# Patient Record
Sex: MALE | Race: WHITE | HISPANIC OR LATINO | Employment: OTHER | URBAN - METROPOLITAN AREA
[De-identification: names, ages, dates, MRNs, and addresses within clinical notes are randomized per-mention and may not be internally consistent; named-entity substitution may affect disease eponyms.]

---

## 2017-01-19 ENCOUNTER — HOSPITAL ENCOUNTER (OUTPATIENT)
Dept: SLEEP CENTER | Facility: CLINIC | Age: 57
Discharge: HOME | End: 2017-01-19
Payer: MEDICARE

## 2017-01-19 ENCOUNTER — TRANSCRIBE ORDERS (OUTPATIENT)
Dept: SLEEP CENTER | Facility: CLINIC | Age: 57
End: 2017-01-19

## 2017-01-19 DIAGNOSIS — G47.33 OBSTRUCTIVE SLEEP APNEA (ADULT) (PEDIATRIC): ICD-10-CM

## 2017-01-19 DIAGNOSIS — G47.33 OBSTRUCTIVE SLEEP APNEA (ADULT) (PEDIATRIC): Primary | ICD-10-CM

## 2018-01-18 ENCOUNTER — HOSPITAL ENCOUNTER (OUTPATIENT)
Dept: SLEEP CENTER | Facility: CLINIC | Age: 58
Discharge: HOME | End: 2018-01-18

## 2018-01-18 ENCOUNTER — TRANSCRIBE ORDERS (OUTPATIENT)
Dept: SLEEP CENTER | Facility: CLINIC | Age: 58
End: 2018-01-18

## 2018-01-18 DIAGNOSIS — G47.33 OBSTRUCTIVE SLEEP APNEA: ICD-10-CM

## 2018-01-18 DIAGNOSIS — G47.33 OBSTRUCTIVE SLEEP APNEA SYNDROME: Primary | ICD-10-CM

## 2018-01-18 NOTE — PROGRESS NOTES
Progress Note - Sleep Center   Madhuri Senior 65 y o  male MRN: [id number]        Follow Up Evaluation / Problem:     1  Obstructive sleep apnea  2  Obesity      HPI: Madhuri Senior is a 65y o  year old male  He has been using nasal CPAP for many years  His last CPAP titration was on 01/19/2007  He has been very compliant with treatment and feels that he is getting sustained benefit with greater wakefulness and no need for daytime naps  ROS: See HPI, all other systems are negative  Historical Information     Past History Since Last Sleep Center Visit:     November this year he underwent replacement of his right ankle  Today he is wearing a boot on that foot and using single-point cane  He states that he will be at full weight-bearing status within the next several weeks  He denies any difficulty with the use of nasal CPAP  Unfortunately, he has been unable to lose any weight since last year  His surgery and inactivity have been definite factors contributing to this problem  ALLERGIES  Recorded on medication sheet in chart     MEDICATION  Recorded on medication sheet in chart    OBJECTIVE    Vital signs are recorded in patient's chart    PAP Pressure:  Nasal CPAP set to deliver 10 centimeters of water pressure  DME Provider: [employer]    Physical Exam: No significant changes other than reported in HPI  ASSESSMENT / PLAN    Assessment:   1  Obstructive sleep apnea  2  Obesity  3  Recent right ankle replacement    Plan:  1  Continue nasal CPAP as above  2  Weight loss using a combination of diet and exercise as possible  3  Consider referral to a weight loss center    Counseling / Coordination of Care  Total clinic time spent today 20 minutes  Greater than 50% of total time was spent with The patient and / or family counseling and / or coordination of care  A description of the counseling and / or coordination of care:     Today we discussed his current use of nasal CPAP   He has been very compliant since the start of treatment  He finds that he continues to obtain good results with relief from daytime sleepiness  He no longer naps  He is much more awake alert and energetic during the day  His weight is not significantly improved, however, his recent right ankle surgery has made it impossible for him to be physically active  This is likely a contributing factor  He will likely start a rehabilitation program in the near future  This along with a better controlled diet should help him to begin to lose weight on his own  If he is unable to achieve weight loss by himself I will consider referring him to a weight loss center  He will continue using nasal CPAP at the parameters noted above  I will provide him with a prescription so that he may receive new supplies over the next 12 months  He will return in 1 year for routine re-evaluation  Ana Lilia Covington DO    Board Certified in [place] [employer]

## 2019-01-24 ENCOUNTER — OFFICE VISIT (OUTPATIENT)
Dept: SLEEP CENTER | Facility: CLINIC | Age: 59
End: 2019-01-24
Payer: MEDICARE

## 2019-01-24 VITALS
SYSTOLIC BLOOD PRESSURE: 138 MMHG | HEART RATE: 74 BPM | HEIGHT: 69 IN | OXYGEN SATURATION: 94 % | WEIGHT: 268 LBS | DIASTOLIC BLOOD PRESSURE: 76 MMHG | BODY MASS INDEX: 39.69 KG/M2

## 2019-01-24 DIAGNOSIS — G47.33 OBSTRUCTIVE SLEEP APNEA TREATED WITH CONTINUOUS POSITIVE AIRWAY PRESSURE (CPAP): Primary | ICD-10-CM

## 2019-01-24 DIAGNOSIS — Z99.89 OBSTRUCTIVE SLEEP APNEA TREATED WITH CONTINUOUS POSITIVE AIRWAY PRESSURE (CPAP): Primary | ICD-10-CM

## 2019-01-24 DIAGNOSIS — E66.01 CLASS 2 SEVERE OBESITY DUE TO EXCESS CALORIES WITH SERIOUS COMORBIDITY AND BODY MASS INDEX (BMI) OF 39.0 TO 39.9 IN ADULT (HCC): ICD-10-CM

## 2019-01-24 PROBLEM — E66.812 CLASS 2 SEVERE OBESITY DUE TO EXCESS CALORIES WITH SERIOUS COMORBIDITY AND BODY MASS INDEX (BMI) OF 39.0 TO 39.9 IN ADULT (HCC): Status: ACTIVE | Noted: 2019-01-24

## 2019-01-24 PROCEDURE — 99214 OFFICE O/P EST MOD 30 MIN: CPT | Performed by: NURSE PRACTITIONER

## 2019-01-24 RX ORDER — DIAZEPAM 10 MG/1
TABLET ORAL AS NEEDED
COMMUNITY

## 2019-01-24 RX ORDER — PREGABALIN 150 MG/1
CAPSULE ORAL EVERY 12 HOURS
COMMUNITY

## 2019-01-24 RX ORDER — MULTIVITAMIN WITH IRON
100 TABLET ORAL DAILY
COMMUNITY

## 2019-01-24 RX ORDER — PERPHENAZINE 16 MG
TABLET ORAL
COMMUNITY

## 2019-01-24 RX ORDER — DICLOFENAC SODIUM 75 MG/1
75 TABLET, DELAYED RELEASE ORAL
COMMUNITY

## 2019-01-24 RX ORDER — OXYCODONE HYDROCHLORIDE AND ACETAMINOPHEN 5; 325 MG/1; MG/1
1 TABLET ORAL AS NEEDED
COMMUNITY

## 2019-01-24 RX ORDER — LISINOPRIL 20 MG/1
TABLET ORAL
COMMUNITY
End: 2020-01-24

## 2019-01-24 RX ORDER — ATORVASTATIN CALCIUM 20 MG/1
20 TABLET, FILM COATED ORAL
COMMUNITY
Start: 2018-08-09

## 2019-01-24 NOTE — PATIENT INSTRUCTIONS
1   Continue use of CPAP equipment nightly  2  Continue to clean your equipment, as discussed  2 5  Review your dream  dell with goal of AHI less than 5   3   Contact the [facility] Disorders Center with any questions or concerns prior to your next visit, as needed  4    Schedule visit for follow-up in 1 year

## 2019-01-24 NOTE — PROGRESS NOTES
Progress Note - [address] 63 y o  male   :1960, MRN: [id number]  2019          Follow Up Evaluation / Problem:     Obstructive Sleep Apnea    HPI: Oly Salgado is a 63y o  year old male  He has been using CPAP to treat his obstructive sleep apnea for many years and is here for an annual follow up visit  He reports that he uses his equipment every night and feels comfortable when using it  He denies frequent night time awakenings or daytime sleepiness  He states that if he does not use it, he develops edema of the uvula, sore throat and extreme daytime sleepiness within 1-2 days of not using it      Review of Systems      Genitourinary none   Cardiology ankle/leg swelling - related to back problems   Gastrointestinal none   Neurology numbness/tingling of an extremity - R/T back problems   Constitutional none   Integumentary none   Psychiatry none   Musculoskeletal joint pain, back pain, sciatica and leg cramps   Pulmonary none   ENT none   Endocrine none   Hematological none       Current Outpatient Prescriptions:     Alpha-Lipoic Acid 600 MG CAPS, Take by mouth, Disp: , Rfl:     atorvastatin (LIPITOR) 20 mg tablet, Take 20 mg by mouth, Disp: , Rfl:     diazepam (VALIUM) 10 mg tablet, every 6 (six) hours, Disp: , Rfl:     diclofenac (VOLTAREN) 75 mg EC tablet, Take 75 mg by mouth, Disp: , Rfl:     lisinopril (ZESTRIL) 20 mg tablet, lisinopril 20 mg tablet, Disp: , Rfl:     oxyCODONE-acetaminophen (PERCOCET) 5-325 mg per tablet, Take 1 tablet by mouth every 6 (six) hours as needed, Disp: , Rfl:     pregabalin (LYRICA) 150 mg capsule, Every 12 hours, Disp: , Rfl:     pyridoxine (VITAMIN B6) 100 mg tablet, Take 100 mg by mouth daily, Disp: , Rfl:     Thiamine HCl (VITAMIN B-1 PO), Take 200 mg by mouth, Disp: , Rfl:     [place] Sleepiness Scale  Sitting and reading: Would never doze  Watching TV: Slight chance of dozing  Sitting, inactive in a public place (e g  a theatre or a meeting): Would never doze  As a passenger in a car for an hour without a break: Would never doze  Lying down to rest in the afternoon when circumstances permit: Slight chance of dozing  Sitting and talking to someone: Would never doze  Sitting quietly after a lunch without alcohol: Would never doze  In a car, while stopped for a few minutes in traffic: Would never doze  Total score: 2              Vitals:    01/24/19 0900   BP: 138/76   Pulse: 74   SpO2: 94%   Weight: 122 kg (268 lb)   Height: 5' 9" (1 753 m)       Body mass index is 39 58 kg/m²  Neck Circumference: 43 (cm)       EPWORTH SLEEPINESS SCORE  Total score: 2      Past History Since Last Sleep Center Visit:     He denies any significant changes to his health since his last visit  He continues with chronic back pain  He reports that he uses his CPAP equipment every night  He cleans it with soap and water and changes his supplies regularly  The review of systems and following portions of the patient's history were reviewed and updated as appropriate: allergies, current medications, past family history, past medical history, past social history, past surgical history, and problem list         OBJECTIVE    PAP Pressure: Nasal CPAP set to deliver 10 cm of water pressure  DME Provider: [employer]    Physical Exam:     General Appearance:   Alert, cooperative, no distress, appears stated age, obese     Head:   Normocephalic, without obvious abnormality, atraumatic     Eyes:   PERRL, conjunctiva/corneas clear, EOM's intact          Nose:  Nares normal, septum midline, no drainage or sinus tenderness           Throat:  Lips, teeth and gums normal; tongue normal size and  shape and midline mucosa moist and redundant bilaterally, uvula only partially visualized, tonsils not visualized, Mallampati class 3-4       Neck:  Supple, symmetrical, trachea midline, no adenopathy;  Thyroid: No enlargement, tenderness or nodules; no carotid bruit or JVD     Lungs:      Clear to auscultation bilaterally, respirations unlabored     Heart:   Regular rate and rhythm, S1 and S2 normal, no murmur, rub or gallop       Extremities:  Extremities normal, atraumatic, no cyanosis or edema     Pulses:  2+ and symmetric all extremities     Skin:  Skin color, texture, turgor normal, no rashes or lesions       Neurologic:  No focal deficits noted  Normal strength, sensation throughout       ASSESSMENT / PLAN    1  Obstructive sleep apnea treated with continuous positive airway pressure (CPAP)  PAP DME Resupply/Reorder   2  Class 2 severe obesity due to excess calories with serious comorbidity and body mass index (BMI) of 39 0 to 39 9 in adult [facility])             Counseling / Coordination of Care  Total clinic time spent today 30 minutes  Greater than 50% of total time was spent with the patient and / or family counseling and / or coordination of care  A description of the counseling / coordination of care:     Impressions, Prognosis, Instructions for management, Risks and benefits of treatment, Patient and family education, Risk factor reductions and Importance of compliance with treatment    Unfortunately, his CPAP equipment is not transmitting data via the modem  He uses the [employer]  and we were able to view that today during his visit  The most recent data available was from April 2018  Upon review, his AHI was consistently less than 2  He will reconnect his blue tooth and upload data to review  We discussed that the AHI should ideally be less than 5  He will call if he has consistently higher numbers  Subjectively, he is doing very well with his CPAP ad he feels he derives significant benefit  Supplies have been ordered for the next year  He will continue to use his equipment nightly over the next year  He will schedule a follow up visit in one year    The following instructions have been given to the patient today:    Patient Instructions   1  Continue use of CPAP equipment nightly  2  Continue to clean your equipment, as discussed  2 5  Review your dream  dell with goal of AHI less than 5   3   Contact the Sleep Disorders Center with any questions or concerns prior to your next visit, as needed  4    Schedule visit for follow-up in 1 year        Naty Mosquera, [facility]

## 2020-01-24 ENCOUNTER — OFFICE VISIT (OUTPATIENT)
Dept: SLEEP CENTER | Facility: CLINIC | Age: 60
End: 2020-01-24
Payer: COMMERCIAL

## 2020-01-24 VITALS — HEIGHT: 69 IN | BODY MASS INDEX: 39.58 KG/M2

## 2020-01-24 DIAGNOSIS — G47.33 OBSTRUCTIVE SLEEP APNEA TREATED WITH CONTINUOUS POSITIVE AIRWAY PRESSURE (CPAP): Primary | ICD-10-CM

## 2020-01-24 DIAGNOSIS — E66.01 CLASS 2 SEVERE OBESITY DUE TO EXCESS CALORIES WITH SERIOUS COMORBIDITY AND BODY MASS INDEX (BMI) OF 39.0 TO 39.9 IN ADULT (HCC): ICD-10-CM

## 2020-01-24 DIAGNOSIS — Z99.89 OBSTRUCTIVE SLEEP APNEA TREATED WITH CONTINUOUS POSITIVE AIRWAY PRESSURE (CPAP): Primary | ICD-10-CM

## 2020-01-24 PROCEDURE — 99214 OFFICE O/P EST MOD 30 MIN: CPT | Performed by: NURSE PRACTITIONER

## 2020-01-24 RX ORDER — LOSARTAN POTASSIUM 100 MG/1
TABLET ORAL EVERY 24 HOURS
COMMUNITY

## 2020-01-24 RX ORDER — OMEPRAZOLE 20 MG/1
CAPSULE, DELAYED RELEASE ORAL
COMMUNITY
Start: 2020-01-22

## 2020-01-24 NOTE — PATIENT INSTRUCTIONS
1   Continue use of CPAP equipment nightly  2  Continue to clean your equipment, as discussed  3  Contact the Sleep [address] [place] [address] with any questions or concerns prior to your next visit, as needed  4  Schedule visit for follow-up in 1 year    Continue to use Dream  dell    Nursing Support:  When: Monday through Friday 7A-5PM except holidays  Where: Our direct line is [phone]  If you are having a true emergency please call 911  In the event that the line is busy or it is after hours please leave a voice message and we will return your call  Please speak clearly, leaving your full name, birth date, best number to reach you and the reason for your call  Medication refills: We will need the name of the medication, the dosage, the ordering provider, whether you get a 30 or 90 day refill, and the pharmacy name and address  Medications will be ordered by the provider only  Nurses cannot call in prescriptions  Please allow 7 days for medication refills  Physician requested updates: If your provider requested that you call with an update after starting medication, please be ready to provide us the medication and dosage, what time you take your medication, the time you attempt to fall asleep, time you fall asleep, when you wake up, and what time you get out of bed  Sleep Study Results: We will contact you with sleep study results and/or next steps after the physician has reviewed your testing

## 2020-01-24 NOTE — PROGRESS NOTES
Progress Note - [address] 63 y o  male   :1960, MRN: [id number]  2020          Follow Up Evaluation / Problem:     Obstructive Sleep Apnea  Obesity      Thank you for the opportunity of participating in the evaluation and care of this patient in the Sleep Clinic at [facility]  HPI: Ehsan Narvaez is a 63y o  year old male  The patient presents for follow up of obstructive sleep apnea  He has used CPAP for many years and reports that he can't sleep without using the equipment  His equipment does not transmit a compliance report, however, he use the [employer] dell on his phone and was able to download a report today for review        Review of Systems      Genitourinary none   Cardiology none   Gastrointestinal none   Neurology numbness/tingling of an extremity   Constitutional none   Integumentary none   Psychiatry none   Musculoskeletal joint pain, muscle aches, back pain, sciatica and leg cramps   Pulmonary none   ENT throat clearing   Endocrine none   Hematological none       Current Outpatient Medications:     Alpha-Lipoic Acid 600 MG CAPS, Take by mouth, Disp: , Rfl:     atorvastatin (LIPITOR) 20 mg tablet, Take 20 mg by mouth, Disp: , Rfl:     diazepam (VALIUM) 10 mg tablet, every 6 (six) hours, Disp: , Rfl:     diclofenac (VOLTAREN) 75 mg EC tablet, Take 75 mg by mouth, Disp: , Rfl:     losartan (COZAAR) 100 MG tablet, every 24 hours, Disp: , Rfl:     omeprazole (PriLOSEC) 20 mg delayed release capsule, , Disp: , Rfl:     oxyCODONE-acetaminophen (PERCOCET) 5-325 mg per tablet, Take 1 tablet by mouth every 6 (six) hours as needed, Disp: , Rfl:     pregabalin (LYRICA) 150 mg capsule, Every 12 hours, Disp: , Rfl:     pyridoxine (VITAMIN B6) 100 mg tablet, Take 100 mg by mouth daily, Disp: , Rfl:     Thiamine HCl (VITAMIN B-1 PO), Take 200 mg by mouth, Disp: , Rfl:     [place] Sleepiness Scale  Sitting and reading: Would never doze  Watching TV: Slight chance of dozing  Sitting, inactive in a public place (e g  a theatre or a meeting): Would never doze  As a passenger in a car for an hour without a break: Would never doze  Lying down to rest in the afternoon when circumstances permit: Would never doze  Sitting and talking to someone: Would never doze  Sitting quietly after a lunch without alcohol: Would never doze  In a car, while stopped for a few minutes in traffic: Would never doze  Total score: 1              Vitals:    01/24/20 0800   Height: 5' 9" (1 753 m)       Body mass index is 39 58 kg/m²  Neck Circumference: 17       EPWORTH SLEEPINESS SCORE  Total score: 1      Past History Since Last Sleep Center Visit:   He denies any changes to his health since his last visit  He reports that he uses his CPAP every night, using a nasal pillow mask  Equipment is set at 10cm of water pressure, which is comfortable for him  The patient reports that he cleans the equipment appropriately and changes supplies on a regular basis  The review of systems and following portions of the patient's history were reviewed and updated as appropriate: allergies, current medications, past family history, past medical history, past social history, past surgical history, and problem list         OBJECTIVE    PAP Pressure: Nasal CPAP set to deliver 10 cm of water pressure  DME Provider:  [employer]    Physical Exam:     General Appearance:   Alert, cooperative, no distress, appears stated age, obese     Head:   Normocephalic, without obvious abnormality, atraumatic     Eyes:   PERRL, conjunctiva/corneas clear, EOM's intact          Nose:  Nares normal, septum midline, no drainage or sinus tenderness           Throat:  Lips, teeth and gums normal; tongue normal size and  shape and midline mucosa moist and redundant bilaterally, uvula thick at the base and dipping below the base of the tongue, tonsils not visualized, Mallampati class 3-4       Neck:  Supple, symmetrical, trachea midline, no adenopathy; Thyroid: No enlargement, tenderness or nodules; no carotid bruit or JVD     Lungs:      Clear to auscultation bilaterally, respirations unlabored     Heart:   Regular rate and rhythm, S1 and S2 normal, no murmur, rub or gallop       Extremities:  Extremities normal, atraumatic, no cyanosis or edema       Skin:  Skin color, texture, turgor normal, no rashes or lesions       Neurologic: No focal deficits noted       ASSESSMENT / PLAN    1  Obstructive sleep apnea treated with continuous positive airway pressure (CPAP)  PAP DME Resupply/Reorder   2  Class 2 severe obesity due to excess calories with serious comorbidity and body mass index (BMI) of 39 0 to 39 9 in [facility])             Counseling / Coordination of Care  Total clinic time spent today 25 minutes  Greater than 50% of total time was spent with the patient and / or family counseling and / or coordination of care  A description of the counseling / coordination of care:     Impressions, Diagnostic results, Prognosis, Instructions for management, Risks and benefits of treatment, Patient and family education, Risk factor reductions and Importance of compliance with treatment    Today I reviewed the patient's compliance data  he has been able to use the equipment 100% of all days recorded  Average usage was 4 or more hours 100% of all days recorded  The estimated AHI is less than 2 5 abnormal breathing events per hour  The patient feels they benefit from the use of PAP equipment and would like to continue PAP therapy  Response to treatment has been good  He received his equipment in August of 2016  He will be eligible for new equipment in August 2021  He will need to complete a split night sleep study to qualify for new CPAP equipment under the Medicare guidelines    He is interested in doing that, when eligible, which will allow him to have his current equipment as back up, if needed  He will continue using this equipment at the settings noted above for the next 12 months  At that timehe will then return for a routine follow-up evaluation  I have asked the patient to contact the [address] if he encounters any difficulties prior to that time  The following instructions have been given to the patient today:    Patient Instructions   1  Continue use of CPAP equipment nightly  2  Continue to clean your equipment, as discussed  3  Contact the [address] with any questions or concerns prior to your next visit, as needed  4  Schedule visit for follow-up in 1 year      Nursing Support:  When: Monday through Friday 7A-5PM except holidays  Where: Our direct line is [phone]  If you are having a true emergency please call 911  In the event that the line is busy or it is after hours please leave a voice message and we will return your call  Please speak clearly, leaving your full name, birth date, best number to reach you and the reason for your call  Medication refills: We will need the name of the medication, the dosage, the ordering provider, whether you get a 30 or 90 day refill, and the pharmacy name and address  Medications will be ordered by the provider only  Nurses cannot call in prescriptions  Please allow 7 days for medication refills  Physician requested updates: If your provider requested that you call with an update after starting medication, please be ready to provide us the medication and dosage, what time you take your medication, the time you attempt to fall asleep, time you fall asleep, when you wake up, and what time you get out of bed  Sleep Study Results: We will contact you with sleep study results and/or next steps after the physician has reviewed your testing        Jessica Sanchez, [address] [place] [address]

## 2020-01-27 ENCOUNTER — TELEPHONE (OUTPATIENT)
Dept: SLEEP CENTER | Facility: CLINIC | Age: 60
End: 2020-01-27

## 2021-01-25 ENCOUNTER — OFFICE VISIT (OUTPATIENT)
Dept: SLEEP CENTER | Facility: CLINIC | Age: 61
End: 2021-01-25
Payer: COMMERCIAL

## 2021-01-25 VITALS
HEIGHT: 69 IN | DIASTOLIC BLOOD PRESSURE: 78 MMHG | WEIGHT: 266.2 LBS | BODY MASS INDEX: 39.43 KG/M2 | SYSTOLIC BLOOD PRESSURE: 132 MMHG | HEART RATE: 64 BPM

## 2021-01-25 DIAGNOSIS — Z99.89 OBSTRUCTIVE SLEEP APNEA TREATED WITH CONTINUOUS POSITIVE AIRWAY PRESSURE (CPAP): Primary | ICD-10-CM

## 2021-01-25 DIAGNOSIS — G47.33 OBSTRUCTIVE SLEEP APNEA TREATED WITH CONTINUOUS POSITIVE AIRWAY PRESSURE (CPAP): Primary | ICD-10-CM

## 2021-01-25 DIAGNOSIS — E66.01 CLASS 2 SEVERE OBESITY DUE TO EXCESS CALORIES WITH SERIOUS COMORBIDITY AND BODY MASS INDEX (BMI) OF 39.0 TO 39.9 IN ADULT (HCC): ICD-10-CM

## 2021-01-25 PROCEDURE — 99214 OFFICE O/P EST MOD 30 MIN: CPT | Performed by: NURSE PRACTITIONER

## 2021-01-25 RX ORDER — LEVOCETIRIZINE DIHYDROCHLORIDE 5 MG/1
TABLET, FILM COATED ORAL
COMMUNITY
Start: 2020-11-04

## 2021-01-25 NOTE — PROGRESS NOTES
Progress Note - [address] 62 y o  male   :1960, MRN: [id number]  2021          Follow Up Evaluation / Problem:  Obstructive Sleep Apnea  Obesity      Thank you for the opportunity of participating in the evaluation and care of this patient in the Sleep Clinic at [facility]  HPI: Shani Lucero is a 62y o  year old male  The patient presents for follow up of obstructive sleep apnea  He has used CPAP for many years and reports that he can't sleep without using the equipment  He feels that as soon as he puts the mask on, his body knows it is time to go to sleep  He denies frequent night time awakenings or excessive daytime sleepiness when using CPAP equipment       Review of Systems      Genitourinary need to urinate more than twice a night   Cardiology none   Gastrointestinal none   Neurology muscle weakness and numbness/tingling of an extremity   Constitutional none   Integumentary none   Psychiatry none   Musculoskeletal joint pain, muscle aches, back pain, legs twitching/jerking, sciatica and leg cramps   Pulmonary frequent cough   ENT throat clearing   Endocrine none   Hematological none         Current Outpatient Medications:     atorvastatin (LIPITOR) 20 mg tablet, Take 20 mg by mouth, Disp: , Rfl:     diazepam (VALIUM) 10 mg tablet, as needed , Disp: , Rfl:     diclofenac (VOLTAREN) 75 mg EC tablet, Take 75 mg by mouth, Disp: , Rfl:     levocetirizine (XYZAL) 5 MG tablet, , Disp: , Rfl:     losartan (COZAAR) 100 MG tablet, every 24 hours, Disp: , Rfl:     omeprazole (PriLOSEC) 20 mg delayed release capsule, , Disp: , Rfl:     oxyCODONE-acetaminophen (PERCOCET) 5-325 mg per tablet, Take 1 tablet by mouth as needed , Disp: , Rfl:     pregabalin (LYRICA) 150 mg capsule, Every 12 hours, Disp: , Rfl:     Alpha-Lipoic Acid 600 MG CAPS, Take by mouth, Disp: , Rfl:     pyridoxine (VITAMIN B6) 100 mg tablet, Take 100 mg by mouth daily, Disp: , Rfl:     Thiamine HCl (VITAMIN B-1 PO), Take 200 mg by mouth, Disp: , Rfl:     [place] Sleepiness Scale  Sitting and reading: Would never doze  Watching TV: Would never doze  Sitting, inactive in a public place (e g  a theatre or a meeting): Would never doze  As a passenger in a car for an hour without a break: Would never doze  Lying down to rest in the afternoon when circumstances permit: Would never doze  Sitting and talking to someone: Would never doze  Sitting quietly after a lunch without alcohol: Would never doze  In a car, while stopped for a few minutes in traffic: Would never doze  Total score: 0              Vitals:    01/25/21 0916   BP: 132/78   Pulse: 64   Weight: 121 kg (266 lb 3 2 oz)   Height: 5' 9" (1 753 m)       Body mass index is 39 31 kg/m²  EPWORTH SLEEPINESS SCORE  Total score: 0      Past History Since Last Sleep Center Visit:   He denies any changes to his health since his last visit  He reports that he uses his CPAP every night, using a nasal pillow mask  Equipment is set at 10cm of water pressure, which is comfortable for him  The patient reports that he cleans the equipment appropriately and changes supplies on a regular basis  The review of systems and following portions of the patient's history were reviewed and updated as appropriate: allergies, current medications, past family history, past medical history, past social history, past surgical history, and problem list         OBJECTIVE    PAP Pressure: Nasal CPAP set to deliver 10 cm of water pressure   Nasal pillow mask  DME Provider:  [facility]    Physical Exam:     General Appearance:   Alert, cooperative, no distress, appears stated age, obese     Head:   Normocephalic, without obvious abnormality, atraumatic     Eyes:   PERRL, conjunctiva/corneas clear, EOM's intact          Nose:  Nares normal, septum midline, no drainage or sinus tenderness           Throat:  Lips, teeth and gums normal; tongue normal size and  shape and midline mucosa moist        Neck:  Supple, symmetrical, trachea midline, no adenopathy; Thyroid: No enlargement, tenderness or nodules; no carotid bruit or JVD     Lungs:      Clear to auscultation bilaterally, respirations unlabored     Heart:   Regular rate and rhythm, S1 and S2 normal, no murmur, rub or gallop       Extremities:  Extremities normal, atraumatic, no cyanosis or edema       Skin:  Skin color, texture, turgor normal, no rashes or lesions       Neurologic: No focal deficits noted  Normal strength and sensation in extremities       ASSESSMENT / PLAN    1  Obstructive sleep apnea treated with continuous positive airway pressure (CPAP)  PAP DME Resupply/Reorder   2  Class 2 severe obesity due to excess calories with serious comorbidity and body mass index (BMI) of 39 0 to 39 9 in adult ([facility] )  PAP DME Resupply/Reorder         Counseling / Coordination of Care  Total clinic time spent today 25 minutes  Greater than 50% of total time was spent with the patient and / or family counseling and / or coordination of care  A description of the counseling / coordination of care:     Impressions, Diagnostic results, Prognosis, Instructions for management, Risks and benefits of treatment, Patient and family education, Risk factor reductions and Importance of compliance with treatment    Today I reviewed the patient's compliance data  he has been able to use the equipment 100% of all days recorded  Average usage was 4 or more hours 100% of all days recorded  The estimated AHI is less than 1 2 abnormal breathing events per hour  The patient feels they benefit from the use of PAP equipment and would like to continue PAP therapy  Response to treatment has been excellent  He received his equipment in November 2016  He will be eligible for new equipment in 2021    He will need to complete a split night sleep study to qualify for new CPAP equipment under the Medicare guidelines  He will call if he wishes to complete testing for new equipment prior to his next visit, otherwise, we will discuss at his next visit in one year  He will continue using this equipment at the settings noted above for the next 12 months  At that timehe will then return for a routine follow-up evaluation  I have asked the patient to contact the Sleep [facility] if he encounters any difficulties prior to that time  The following instructions have been given to the patient today:    Patient Instructions   1  Continue use of CPAP equipment nightly  2  Continue to clean your equipment, as discussed  3  Contact the Sleep [facility] with any questions or concerns prior to your next visit, as needed  4  Schedule visit for follow-up in 1 year    Nursing Support:  When: Monday through Friday 7A-5PM except holidays  Where: Our direct line is [phone]  If you are having a true emergency please call 911  In the event that the line is busy or it is after hours please leave a voice message and we will return your call  Please speak clearly, leaving your full name, birth date, best number to reach you and the reason for your call  Medication refills: We will need the name of the medication, the dosage, the ordering provider, whether you get a 30 or 90 day refill, and the pharmacy name and address  Medications will be ordered by the provider only  Nurses cannot call in prescriptions  Please allow 7 days for medication refills  Physician requested updates: If your provider requested that you call with an update after starting medication, please be ready to provide us the medication and dosage, what time you take your medication, the time you attempt to fall asleep, time you fall asleep, when you wake up, and what time you get out of bed  Sleep Study Results:  We will contact you with sleep study results and/or next steps after the physician has reviewed your testing        Roxann Jose, [facility]

## 2021-01-25 NOTE — PATIENT INSTRUCTIONS
1   Continue use of CPAP equipment nightly  2  Continue to clean your equipment, as discussed  3  Contact the Sleep [address] [place] [address] with any questions or concerns prior to your next visit, as needed  4  Schedule visit for follow-up in 1 year    Nursing Support:  When: Monday through Friday 7A-5PM except holidays  Where: Our direct line is [phone]  If you are having a true emergency please call 911  In the event that the line is busy or it is after hours please leave a voice message and we will return your call  Please speak clearly, leaving your full name, birth date, best number to reach you and the reason for your call  Medication refills: We will need the name of the medication, the dosage, the ordering provider, whether you get a 30 or 90 day refill, and the pharmacy name and address  Medications will be ordered by the provider only  Nurses cannot call in prescriptions  Please allow 7 days for medication refills  Physician requested updates: If your provider requested that you call with an update after starting medication, please be ready to provide us the medication and dosage, what time you take your medication, the time you attempt to fall asleep, time you fall asleep, when you wake up, and what time you get out of bed  Sleep Study Results: We will contact you with sleep study results and/or next steps after the physician has reviewed your testing

## 2021-01-26 ENCOUNTER — TELEPHONE (OUTPATIENT)
Dept: SLEEP CENTER | Facility: CLINIC | Age: 61
End: 2021-01-26

## 2021-08-11 ENCOUNTER — TELEPHONE (OUTPATIENT)
Dept: SLEEP CENTER | Facility: CLINIC | Age: 61
End: 2021-08-11

## 2021-08-11 NOTE — TELEPHONE ENCOUNTER
Patient left message, states he saw Bud Olivera in January and he was notified that he will be due for a new machine in August, but will need a new sleep study  Asking if we could get things started and order sleep study?     Please advise

## 2021-11-11 ENCOUNTER — OFFICE VISIT (OUTPATIENT)
Dept: SLEEP CENTER | Facility: CLINIC | Age: 61
End: 2021-11-11
Payer: COMMERCIAL

## 2021-11-11 VITALS
HEART RATE: 70 BPM | WEIGHT: 213 LBS | SYSTOLIC BLOOD PRESSURE: 136 MMHG | BODY MASS INDEX: 31.55 KG/M2 | HEIGHT: 69 IN | DIASTOLIC BLOOD PRESSURE: 82 MMHG

## 2021-11-11 DIAGNOSIS — Z99.89 OBSTRUCTIVE SLEEP APNEA TREATED WITH CONTINUOUS POSITIVE AIRWAY PRESSURE (CPAP): Primary | ICD-10-CM

## 2021-11-11 DIAGNOSIS — G47.33 OBSTRUCTIVE SLEEP APNEA TREATED WITH CONTINUOUS POSITIVE AIRWAY PRESSURE (CPAP): Primary | ICD-10-CM

## 2021-11-11 PROCEDURE — 99214 OFFICE O/P EST MOD 30 MIN: CPT | Performed by: NURSE PRACTITIONER

## 2021-11-16 ENCOUNTER — TELEPHONE (OUTPATIENT)
Dept: SLEEP CENTER | Facility: CLINIC | Age: 61
End: 2021-11-16

## 2022-10-25 ENCOUNTER — PATIENT MESSAGE (OUTPATIENT)
Dept: SLEEP CENTER | Facility: CLINIC | Age: 62
End: 2022-10-25

## 2022-10-26 ENCOUNTER — TELEPHONE (OUTPATIENT)
Dept: SLEEP CENTER | Facility: CLINIC | Age: 62
End: 2022-10-26

## 2022-10-26 NOTE — TELEPHONE ENCOUNTER
----- Message from Arthur Canela sent at 10/25/2022  7:46 PM EDT -----  Regarding: Increased AHI  Pallavi Rodriguez, I’m writing I had cervical surgery in August and ever since then I have felt a lot of swelling in my throat, and my instances has gone up  I was wondering how I might be able to handle this  I did contact the surgeon who fused C5-6 c6-7 I think it is and he put me on steroids for a week and the week after things went down but now things are going right back up  Not sure how to handle this  I also note, I am increasingly more tired

## 2022-10-26 NOTE — TELEPHONE ENCOUNTER
Patient has not been seen in the office since 11/11/21  Called patient and scheduled follow up with Omari Schneider on 11/22/22 in [place]  Advised I would reach out to [facility] for compliance data for Omari Schneider to review  Nursing will call him back once Omari Schneider makes a recommendation  Compliance data requested from [facility] liaison

## 2022-10-27 NOTE — TELEPHONE ENCOUNTER
Received reply via email from [employer]  Per [employer], there is no data from patient so he will need to bring his SD card in to office to get data  I called patient to inform him of this and he stated he has a modem and asked if [employer] can access his data via the [employer]  Informed [employer] liaison Annalisa of above  She called patient yesterday and discussed having the patient force the modem today when he is able  Awaiting further communication from [employer] regarding data

## 2022-10-28 NOTE — TELEPHONE ENCOUNTER
Per email from [facility], [facility] liaison, patient has not yet called back

## 2022-11-01 NOTE — TELEPHONE ENCOUNTER
Called patient who states he spoke to someone at [facility]'s sleep department who said they are able to acquire his recent compliance data  They were supposed to email a copy to the office  Compliance data found in media  Marianna Tubbs, please review data and advise

## 2022-11-22 ENCOUNTER — TELEPHONE (OUTPATIENT)
Dept: SLEEP CENTER | Facility: CLINIC | Age: 62
End: 2022-11-22

## 2022-11-22 ENCOUNTER — OFFICE VISIT (OUTPATIENT)
Dept: SLEEP CENTER | Facility: CLINIC | Age: 62
End: 2022-11-22

## 2022-11-22 VITALS
BODY MASS INDEX: 33.92 KG/M2 | WEIGHT: 229 LBS | DIASTOLIC BLOOD PRESSURE: 80 MMHG | HEIGHT: 69 IN | SYSTOLIC BLOOD PRESSURE: 118 MMHG

## 2022-11-22 DIAGNOSIS — G47.33 OBSTRUCTIVE SLEEP APNEA TREATED WITH CONTINUOUS POSITIVE AIRWAY PRESSURE (CPAP): Primary | ICD-10-CM

## 2022-11-22 DIAGNOSIS — E66.09 CLASS 1 OBESITY DUE TO EXCESS CALORIES WITHOUT SERIOUS COMORBIDITY WITH BODY MASS INDEX (BMI) OF 33.0 TO 33.9 IN ADULT: ICD-10-CM

## 2022-11-22 DIAGNOSIS — Z99.89 OBSTRUCTIVE SLEEP APNEA TREATED WITH CONTINUOUS POSITIVE AIRWAY PRESSURE (CPAP): Primary | ICD-10-CM

## 2022-11-22 PROBLEM — E66.811 CLASS 1 OBESITY DUE TO EXCESS CALORIES WITH BODY MASS INDEX (BMI) OF 33.0 TO 33.9 IN ADULT: Status: ACTIVE | Noted: 2019-01-24

## 2022-11-22 RX ORDER — LISINOPRIL 10 MG/1
TABLET ORAL
COMMUNITY
End: 2022-11-22

## 2022-11-22 RX ORDER — CELECOXIB 200 MG/1
CAPSULE ORAL
COMMUNITY
Start: 2022-08-27 | End: 2022-11-22

## 2022-11-22 RX ORDER — DIAZEPAM 5 MG/1
5 TABLET ORAL
COMMUNITY
Start: 2022-08-27

## 2022-11-22 NOTE — PATIENT INSTRUCTIONS
1  Schedule an appointment for set up of PAP equipment  2  Begin using your equipment every night  3  Begin cleaning your equipment, as recommended   4  Contact your medical equipment distributor regarding any questions concerning your PAP equipment  5  Contact the Sleep [address] [place] [address] with any other questions, prior to next visit, if needed  6  Schedule compliance follow-up visit in 31-91 days, as required by insurance         Nursing Support:  When: Monday through Friday 7A-5PM except holidays  Where: Our direct line is [phone]  If you are having a true emergency please call 911  In the event that the line is busy or it is after hours please leave a voice message and we will return your call  Please speak clearly, leaving your full name, birth date, best number to reach you and the reason for your call  Medication refills: We will need the name of the medication, the dosage, the ordering provider, whether you get a 30 or 90 day refill, and the pharmacy name and address  Medications will be ordered by the provider only  Nurses cannot call in prescriptions  Please allow 7 days for medication refills  Physician requested updates: If your provider requested that you call with an update after starting medication, please be ready to provide us the medication and dosage, what time you take your medication, the time you attempt to fall asleep, time you fall asleep, when you wake up, and what time you get out of bed  Sleep Study Results: We will contact you with sleep study results and/or next steps after the physician has reviewed your testing

## 2022-11-22 NOTE — PROGRESS NOTES
Progress Note - [address] 62 y o  male   :1960, MRN: [id number]  2022      Follow Up Evaluation / Problem:  Obstructive Sleep Apnea  Obesity      Thank you for the opportunity of participating in the evaluation and care of this patient in the Sleep Clinic at [facility]  HPI: Felton Obrien is a 62y o  year old male  The patient presents for follow up of obstructive sleep apnea  He was originally diagnosed with DIANA in   He has used CPAP equipment for more than 15 years and reports that he can't sleep without using the equipment  He presents to review annual compliance and effectiveness of treatment        Review of Systems      Genitourinary none   Cardiology none   Gastrointestinal none   Neurology muscle weakness and numbness/tingling of an extremity   Constitutional none   Integumentary none   Psychiatry none   Musculoskeletal joint pain, muscle aches, back pain, sciatica and leg cramps   Pulmonary snoring   ENT throat clearing   Endocrine none   Hematological none       Current Outpatient Medications:   •  atorvastatin (LIPITOR) 20 mg tablet, Take 20 mg by mouth, Disp: , Rfl:   •  Cholecalciferol 1 25 MG (70443 UT) TABS, Take 50,000 Int'l Units by mouth, Disp: , Rfl:   •  diazepam (VALIUM) 10 mg tablet, as needed , Disp: , Rfl:   •  diazepam (VALIUM) 5 mg tablet, 5 mg PRN, Disp: , Rfl:   •  diclofenac (VOLTAREN) 75 mg EC tablet, Take 75 mg by mouth, Disp: , Rfl:   •  levocetirizine (XYZAL) 5 MG tablet, , Disp: , Rfl:   •  losartan (COZAAR) 100 MG tablet, every 24 hours, Disp: , Rfl:   •  omeprazole (PriLOSEC) 20 mg delayed release capsule, , Disp: , Rfl:   •  oxyCODONE-acetaminophen (PERCOCET) 5-325 mg per tablet, Take 1 tablet by mouth as needed , Disp: , Rfl:   •  pregabalin (LYRICA) 150 mg capsule, Every 12 hours, Disp: , Rfl:     [place] Sleepiness Scale  Sitting and reading: Would never doze  Watching TV: Slight chance of dozing  Sitting, inactive in a public place (e g  a theatre or a meeting): Would never doze  As a passenger in a car for an hour without a break: Would never doze  Lying down to rest in the afternoon when circumstances permit: Slight chance of dozing  Sitting and talking to someone: Would never doze  Sitting quietly after a lunch without alcohol: Would never doze  In a car, while stopped for a few minutes in traffic: Would never doze  Total score: 2              Vitals:    11/22/22 0835   BP: 118/80   Weight: 104 kg (229 lb)   Height: 5' 9" (1 753 m)       Body mass index is 33 82 kg/m²  EPWORTH SLEEPINESS SCORE  Total score: 2      Past History Since Last Sleep Center Visit:   He had neck surgery and has noticed that he is snoring and has woken up feeling like he can't breathe  AHI was noted to have been 2 4 in October  He has noticed that on some days he has up to 30 obstructive apneas per night, averaging AHI of 6/hour  He continues to wake up feeling less refreshed and feels that his throat is swollen  He is unsure if it is related to the neurosurgery or snoring  He denies air leaks with his current mask  The patient's CPAP equipment has been recalled  The patient has never used an ozone  to clean the equipment  He still has not received the recall replacement, however, he has registered the equipment  The review of systems and following portions of the patient's history were reviewed and updated as appropriate: allergies, current medications, past family history, past medical history, past social history, past surgical history, and problem list         OBJECTIVE  Set up 5/2017  PAP Pressure: Nasal CPAP set to deliver 10 cm of water pressure  - Pressure change ordered today to 10-12cm of water pressure  Nasal pillow mask  DME Provider:  Mona Polanco    Physical Exam:     General Appearance:   Alert, cooperative, no distress, appears stated age, obese     Head:   Normocephalic, without obvious abnormality, atraumatic     Eyes:   PERRL, conjunctiva/corneas clear          Nose:  Nares normal, septum midline, no drainage or sinus tenderness           Throat:  Lips, teeth and gums normal; tongue normal size and midline mucosa moist and mildly redundant bilaterally, uvula normal, tonsils not visualized, Mallampati class 3      Neck:  Supple, symmetrical, trachea midline, no adenopathy; Thyroid: No enlargement, tenderness or nodules; no carotid bruit or JVD     Lungs:      Clear to auscultation bilaterally, respirations unlabored     Heart:   Regular rate and rhythm, S1 and S2 normal, no murmur, rub or gallop       Extremities:  Extremities normal, atraumatic, no cyanosis or edema       Skin:  Skin color, texture, turgor normal, no rashes or lesions       Neurologic:   No focal deficits noted  ASSESSMENT / PLAN    1  Obstructive sleep apnea treated with continuous positive airway pressure (CPAP)  PAP DME Pressure Change    Cpap DME      2  Class 1 obesity due to excess calories without serious comorbidity with body mass index (BMI) of 33 0 to 33 9 in adult              Counseling / Coordination of Care  Total clinic time spent today 30 minutes  Greater than 50% of total time was spent with the patient and / or family counseling and / or coordination of care  A description of the counseling / coordination of care:     Impressions, Diagnostic results, Prognosis, Instructions for management, Risks and benefits of treatment, Patient and family education, Risk factor reductions and Importance of compliance with treatment    Today I reviewed the patient's compliance data  he has been able to use the equipment 90% of all days recorded  Average usage was 4 or more hours 86 7% of all days recorded  The estimated AHI is 2 6 abnormal breathing events per hour   A pressure change to APAP 10-12cm has been ordered, due to concern of waking up feeling like he can't breathe  The patient feels they benefit from the use of PAP equipment and would like to continue PAP therapy  Response to treatment has been excellent  We discussed the recent recall of the patient's PAP equipment  The risks and benefits for continued use vs  Discontinuation of PAP therapy were discussed  It is ultimately the patient's decision whether or not to continue PAP therapy at this time, however, he states that he can't sleep without it  The patient has registered his equipment on the "[employer]", which will give him further direction in the future replacement of the equipment  He reports that he contacted [employer] and they are unable to tell him when he may receive his recall replacement equipment  Since the ozone cleaning devices have been suspected as a causative agent in the recall, the patient has been advised to avoid using any ozone cleaning device and clean the equipment using mild soap and water  He received his equipment in November 2016  He requests to have his equipment replaced, using his medical insurance  A prescription for replacement equipment has been provided  He completed a diagnostic study in 2007  Results are not in the EMR  He may need to complete a diagnostic sleep study to confirm the DIANA diagnosis, if results are not able to be discovered and if they are not scored the the current Medicare guidelines  He plans to continue using this equipment at the settings noted above until he receives replacement, because he can't sleep without it  I have asked the patient to contact the Sleep [address] if he encounters any difficulties prior to that time  The following instructions have been given to the patient today:    Patient Instructions   1  Schedule an appointment for set up of PAP equipment  2  Begin using your equipment every night  3  Begin cleaning your equipment, as recommended   4  Contact your medical equipment distributor regarding any questions concerning your PAP equipment  5  Contact the Sleep [facility] with any other questions, prior to next visit, if needed  6  Schedule compliance follow-up visit in 31-91 days, as required by insurance         Nursing Support:  When: Monday through Friday 7A-5PM except holidays  Where: Our direct line is [phone]  If you are having a true emergency please call 911  In the event that the line is busy or it is after hours please leave a voice message and we will return your call  Please speak clearly, leaving your full name, birth date, best number to reach you and the reason for your call  Medication refills: We will need the name of the medication, the dosage, the ordering provider, whether you get a 30 or 90 day refill, and the pharmacy name and address  Medications will be ordered by the provider only  Nurses cannot call in prescriptions  Please allow 7 days for medication refills  Physician requested updates: If your provider requested that you call with an update after starting medication, please be ready to provide us the medication and dosage, what time you take your medication, the time you attempt to fall asleep, time you fall asleep, when you wake up, and what time you get out of bed  Sleep Study Results: We will contact you with sleep study results and/or next steps after the physician has reviewed your testing        Teddy Vanegas [facility]

## 2022-11-29 NOTE — PROGRESS NOTES
Results of diagnostic sleep study are not available in EMR or in past paper records  Message left for patient informing him that he would need to complete a diagnostic sleep study to be able to receive a new CPAP machine  Per Kwabnea Franz, it is noted in the Care  that he should be receiving a recall replacement CPAP in late December  He was advised to call the Sleep Center if he would like to pursue testing

## 2022-12-08 ENCOUNTER — TELEPHONE (OUTPATIENT)
Dept: SLEEP CENTER | Facility: CLINIC | Age: 62
End: 2022-12-08

## 2022-12-08 DIAGNOSIS — G47.33 OBSTRUCTIVE SLEEP APNEA TREATED WITH CONTINUOUS POSITIVE AIRWAY PRESSURE (CPAP): Primary | ICD-10-CM

## 2022-12-08 DIAGNOSIS — Z99.89 OBSTRUCTIVE SLEEP APNEA TREATED WITH CONTINUOUS POSITIVE AIRWAY PRESSURE (CPAP): Primary | ICD-10-CM

## 2022-12-08 NOTE — TELEPHONE ENCOUNTER
Per chart message from Ty Pallas CRNP:  Results of diagnostic sleep study are not available in EMR or in past paper records  Message left for patient informing him that he would need to complete a diagnostic sleep study to be able to receive a new CPAP machine  Per Kwabena Franz, it is noted in the Care  that he should be receiving a recall replacement CPAP in late December  He was advised to call the Sleep Center if he would like to pursue testing   --------------------------------------------------------    Patient left voice message stating he did receive his machine from but he would like to proceed with sleep study oin case this machine breaks  He knows he would need a new sleep study to get another machine so he would like to schedule it now  He also wants to reschedule his follow up appointment  Araceli Kwong, please write order for sleep study if agreeable

## 2023-06-20 ENCOUNTER — HOSPITAL ENCOUNTER (OUTPATIENT)
Dept: SLEEP CENTER | Facility: CLINIC | Age: 63
Discharge: HOME | End: 2023-06-20
Payer: COMMERCIAL

## 2023-06-20 DIAGNOSIS — Z99.89 OBSTRUCTIVE SLEEP APNEA TREATED WITH CONTINUOUS POSITIVE AIRWAY PRESSURE (CPAP): ICD-10-CM

## 2023-06-20 DIAGNOSIS — G47.33 OBSTRUCTIVE SLEEP APNEA TREATED WITH CONTINUOUS POSITIVE AIRWAY PRESSURE (CPAP): ICD-10-CM

## 2023-06-20 PROCEDURE — 95811 POLYSOM 6/>YRS CPAP 4/> PARM: CPT

## 2023-06-20 PROCEDURE — 95810 POLYSOM 6/> YRS 4/> PARAM: CPT | Performed by: PSYCHIATRY & NEUROLOGY

## 2023-06-21 NOTE — PROGRESS NOTES
Sleep Study Documentation    Pre-Sleep Study       Sleep testing procedure explained to patient:YES    Patient napped prior to study:NO    [address] [place] worker after 12PM   Caffeine use:NO    Alcohol:Dayshift workers after 5PM: Alcohol use:NO    Typical day for patient:YES       Study Documentation    Sleep Study Indications: Nocturnal choking, unrefreshing sleep, excessive daytime sleepiness, BMI >30, and witnessed apneas  Sleep Study: Split Optimal PAP pressure: 10cm   Leak:Small  Snore:Eliminated  REM Obtained:yes  Supplemental O2: no    Minimum SaO2 80%  Baseline SaO2 93%  PAP mask tried (list all) Respironics DreamWear Silicone    PAP mask choice (final) Respironics Dream Wear Silicone   PAP mask type:nasal  PAP pressure at which snoring was eliminated 6cm   Minimum SaO2 at final PAP pressure 92%  Mode of Therapy:CPAP  ETCO2:No  CPAP changed to BiPAP:No        EKG abnormalities: no     EEG abnormalities: no    Sleep Study Recorded < 2 hours: N/A    Sleep Study Recorded > 2 hours but incomplete study: N/A    Sleep Study Recorded 6 hours but no sleep obtained: NO    Patient classification: retired       Post-Sleep Study    Medication used at bedtime or during sleep study:YES other prescription medications    Patient reports time it took to fall asleep:less than 20 minutes    Patient reports waking up during study:Denied    Patient reports sleeping 6 to 8 hours without dreaming  Patient reports sleep during study:typical    Patient rated sleepiness: Not sleepy or tired    PAP treatment:yes: Post PAP treatment patient reports feeling unchanged and would wear PAP mask at home

## 2023-06-27 DIAGNOSIS — G47.33 OBSTRUCTIVE SLEEP APNEA TREATED WITH CONTINUOUS POSITIVE AIRWAY PRESSURE (CPAP): Primary | ICD-10-CM

## 2023-06-27 DIAGNOSIS — Z99.89 OBSTRUCTIVE SLEEP APNEA TREATED WITH CONTINUOUS POSITIVE AIRWAY PRESSURE (CPAP): Primary | ICD-10-CM

## 2023-06-28 ENCOUNTER — TELEPHONE (OUTPATIENT)
Dept: SLEEP CENTER | Facility: CLINIC | Age: 63
End: 2023-06-28

## 2023-06-28 NOTE — TELEPHONE ENCOUNTER
Called patient and advised of sleep study results  Scheduled DME set up 7/17/23 in [place]  Rx for CPAP and clinical information sent to [address] [place] [address] via [facility]  Scheduled compliance follow up 1/22/24   Added to wait list

## 2023-06-28 NOTE — TELEPHONE ENCOUNTER
----- Message from Pippa Miller, [address]  sent at 6/27/2023  3:49 PM EDT -----  The split night sleep study confirmed moderate sleep apnea, becoming severe in REM sleep  Equipment was titrated to a setting of 10cm of water pressure  A prescription for equipment was provided  Patient to be scheduled for set up of equipment and compliance follow up

## 2023-06-29 LAB

## 2023-07-07 LAB

## 2023-07-11 LAB

## 2023-07-17 LAB

## 2023-07-18 ENCOUNTER — TELEPHONE (OUTPATIENT)
Dept: SLEEP CENTER | Facility: CLINIC | Age: 63
End: 2023-07-18

## 2023-07-18 NOTE — TELEPHONE ENCOUNTER
Pt. was set up on 7/17/2023 by Casey Dangelo on a ResMed S11 PAP 10 cm flex 2 and mask none--not eligible.

## 2024-01-22 ENCOUNTER — OFFICE VISIT (OUTPATIENT)
Dept: SLEEP CENTER | Facility: CLINIC | Age: 64
End: 2024-01-22
Payer: COMMERCIAL

## 2024-01-22 VITALS
HEART RATE: 67 BPM | DIASTOLIC BLOOD PRESSURE: 80 MMHG | RESPIRATION RATE: 17 BRPM | BODY MASS INDEX: 32.91 KG/M2 | WEIGHT: 222.2 LBS | SYSTOLIC BLOOD PRESSURE: 120 MMHG | TEMPERATURE: 97.2 F | HEIGHT: 69 IN | OXYGEN SATURATION: 97 %

## 2024-01-22 DIAGNOSIS — G47.33 OBSTRUCTIVE SLEEP APNEA TREATED WITH CONTINUOUS POSITIVE AIRWAY PRESSURE (CPAP): Primary | ICD-10-CM

## 2024-01-22 PROCEDURE — 99214 OFFICE O/P EST MOD 30 MIN: CPT | Performed by: NURSE PRACTITIONER

## 2024-01-22 RX ORDER — SEMAGLUTIDE 0.68 MG/ML
0.25 INJECTION, SOLUTION SUBCUTANEOUS WEEKLY
COMMUNITY
Start: 2023-12-08

## 2024-01-22 NOTE — PATIENT INSTRUCTIONS
1.  Continue use of CPAP equipment nightly  2.  Continue to clean your equipment, as discussed  3.  Contact the Sleep Disorders Center with any questions or concerns prior to your next visit, as needed  4.  Schedule visit for follow-up in 1 year         Nursing Support:  When: Monday through Friday 7A-5PM except holidays  Where: Our direct line is [phone].    If you are having a true emergency please call 911.  In the event that the line is busy or it is after hours please leave a voice message and we will return your call.  Please speak clearly, leaving your full name, birth date, best number to reach you and the reason for your call.   Medication refills: We will need the name of the medication, the dosage, the ordering provider, whether you get a 30 or 90 day refill, and the pharmacy name and address.  Medications will be ordered by the provider only.  Nurses cannot call in prescriptions.  Please allow 7 days for medication refills.  Physician requested updates: If your provider requested that you call with an update after starting medication, please be ready to provide us the medication and dosage, what time you take your medication, the time you attempt to fall asleep, time you fall asleep, when you wake up, and what time you get out of bed.  Sleep Study Results: We will contact you with sleep study results and/or next steps after the physician has reviewed your testing.

## 2024-01-22 NOTE — PROGRESS NOTES
Progress Note - [facility]   Ryan Lucas 63 y.o. male   :1960, MRN: [id number]  2024      Follow Up Evaluation / Problem:  Moderate Obstructive Sleep Apnea  Obesity      Thank you for the opportunity of participating in the evaluation and care of this patient in the Sleep Clinic at [facility]      HPI: Ryan Lucas is a 63 y.o. year old male.  The patient presents for follow up of obstructive sleep apnea.  He was originally diagnosed with DIANA in .  He has used CPAP equipment for more than 15 years and reports that he can't sleep without using the equipment.    DIANA was re-evaluated with a split night sleep study in 2023.  AHI was 20.3, worsening to 72 in REM sleep.  Oxygen eitan was 79% with only 1.7 minutes spent at saturations less than 90%.  There were minimal PLMs noted associated with arousal.  He presents to review compliance and effectiveness of treatment with his new CPAP equipment.      Current Outpatient Medications:     atorvastatin (LIPITOR) 20 mg tablet, Take 20 mg by mouth, Disp: , Rfl:     Cholecalciferol 1.25 MG (76575 UT) TABS, Take 50,000 Int'l Units by mouth, Disp: , Rfl:     diclofenac (VOLTAREN) 75 mg EC tablet, Take 75 mg by mouth, Disp: , Rfl:     levocetirizine (XYZAL) 5 MG tablet, , Disp: , Rfl:     losartan (COZAAR) 100 MG tablet, every 24 hours, Disp: , Rfl:     omeprazole (PriLOSEC) 20 mg delayed release capsule, , Disp: , Rfl:     oxyCODONE-acetaminophen (PERCOCET) 5-325 mg per tablet, Take 1 tablet by mouth as needed , Disp: , Rfl:     Ozempic, 0.25 or 0.5 MG/DOSE, 2 MG/3ML injection pen, Inject 0.25 mg under the skin Once a week, Disp: , Rfl:     diazepam (VALIUM) 10 mg tablet, as needed , Disp: , Rfl:     diazepam (VALIUM) 5 mg tablet, 5 mg PRN, Disp: , Rfl:     pregabalin (LYRICA) 150 mg capsule, Every 12 hours (Patient not taking: Reported on 2024), Disp: , Rfl:     How likely are you to doze  "off or fall asleep in the following situations, in contrast to feeling just tired?  Sitting and reading: Would never doze  Watching TV: Slight chance of dozing  Sitting, inactive in a public place (e.g. a theatre or a meeting): Would never doze  As a passenger in a car for an hour without a break: Would never doze  Lying down to rest in the afternoon when circumstances permit: Slight chance of dozing  Sitting and talking to someone: Would never doze  Sitting quietly after a lunch without alcohol: Would never doze  In a car, while stopped for a few minutes in traffic: Would never doze  Total score: 2              Vitals:    01/22/24 0910   BP: 120/80   Pulse: 67   Resp: 17   Temp: (!) 97.2 °F (36.2 °C)   SpO2: 97%   Weight: 101 kg (222 lb 3.2 oz)   Height: 5' 9\" (1.753 m)       Body mass index is 32.81 kg/m².            EPWORTH SLEEPINESS SCORE  Total score: 2      Past History Since Last Sleep Center Visit:   He completed a split night sleep study in June, for re-evaluation to be able to receive new CPAP equipment, as well as to re-evaluate an elevated AHI.      He received new CPAP equipment in July 2023.  Equipment was ordered to be set to 10cm of water pressure.  He uses a nasal pillow mask.  He feels that he needs to work to draw in a breath when he first puts the mask on.  He denies waking up feeling that the setting is too strong or not enough.       The review of systems and following portions of the patient's history were reviewed and updated as appropriate: allergies, current medications, past family history, past medical history, past social history, past surgical history, and problem list.        OBJECTIVE  Set up 5/2017  PAP Pressure: Equipment was set to 4-20cm  - Pressure change ordered today to 6-14cm  Nasal pillow mask  DME Provider: [employer]    Physical Exam:     General Appearance:   Alert, cooperative, no distress, appears stated age, obese     Lungs:    Clear to auscultation bilaterally, " respirations unlabored     Heart:   Regular rate and rhythm, S1 and S2 normal, no murmur, rub or gallop           ASSESSMENT / PLAN    1. Obstructive sleep apnea treated with continuous positive airway pressure (CPAP)  Resmed DME Pressure Change    PAP DME Resupply/Reorder            Counseling / Coordination of Care  I have spent a total time of 30 minutes on 01/22/24 in caring for this patient including Risks and benefits of tx options, Instructions for management, Patient and family education, Importance of tx compliance, Risk factor reductions, Impressions, Counseling / Coordination of care, Documenting in the medical record, and Reviewing / ordering tests, medicine, procedures  .      Today I reviewed the patient's split night sleep study results from June.  We also reviewed compliance data.  CPAP equipment was ordered to be set up at 10cm of water pressure.  The equipment is set to 4-20cm of water pressure.  he has been able to use the equipment 100% of all days recorded.  Average usage was 4 or more hours 97% of all days recorded.  The estimated AHI is 0.8 abnormal breathing events per hour. A pressure change to APAP 6-14cm has been ordered, to narrow the range (previously from 4-20cm).   He is at goal for hours of use and effectiveness of treatment.  The patient feels they benefit from the use of PAP equipment and would like to continue PAP therapy.  Response to treatment has been excellent.      He plans to continue using this equipment at the settings for the next year.  He uses the "[employer] dell and was advised to call if he notices increase in events consistently higher than 5.   I have asked the patient to contact the Sleep Disorders Center if he encounters any difficulties prior to that time.    The following instructions have been given to the patient today:    Patient Instructions   1.  Continue use of CPAP equipment nightly  2.  Continue to clean your equipment, as discussed  3.  Contact the Sleep  Disorders Center with any questions or concerns prior to your next visit, as needed  4.  Schedule visit for follow-up in 1 year         Nursing Support:  When: Monday through Friday 7A-5PM except holidays  Where: Our direct line is [phone].    If you are having a true emergency please call 911.  In the event that the line is busy or it is after hours please leave a voice message and we will return your call.  Please speak clearly, leaving your full name, birth date, best number to reach you and the reason for your call.   Medication refills: We will need the name of the medication, the dosage, the ordering provider, whether you get a 30 or 90 day refill, and the pharmacy name and address.  Medications will be ordered by the provider only.  Nurses cannot call in prescriptions.  Please allow 7 days for medication refills.  Physician requested updates: If your provider requested that you call with an update after starting medication, please be ready to provide us the medication and dosage, what time you take your medication, the time you attempt to fall asleep, time you fall asleep, when you wake up, and what time you get out of bed.  Sleep Study Results: We will contact you with sleep study results and/or next steps after the physician has reviewed your testing.      ROBERT Escobedo  [facility]

## 2024-01-23 ENCOUNTER — TELEPHONE (OUTPATIENT)
Dept: SLEEP CENTER | Facility: CLINIC | Age: 64
End: 2024-01-23

## 2024-01-25 LAB

## 2024-05-24 DIAGNOSIS — Z00.6 ENCOUNTER FOR EXAMINATION FOR NORMAL COMPARISON OR CONTROL IN CLINICAL RESEARCH PROGRAM: ICD-10-CM

## 2024-06-03 ENCOUNTER — APPOINTMENT (OUTPATIENT)
Dept: LAB | Facility: CLINIC | Age: 64
End: 2024-06-03

## 2024-06-03 DIAGNOSIS — Z00.6 ENCOUNTER FOR EXAMINATION FOR NORMAL COMPARISON OR CONTROL IN CLINICAL RESEARCH PROGRAM: ICD-10-CM

## 2024-06-03 PROCEDURE — 36415 COLL VENOUS BLD VENIPUNCTURE: CPT

## 2024-09-06 LAB
APOB+LDLR+PCSK9 GENE MUT ANL BLD/T: NOT DETECTED
BRCA1+BRCA2 DEL+DUP + FULL MUT ANL BLD/T: NOT DETECTED
MLH1+MSH2+MSH6+PMS2 GN DEL+DUP+FUL M: NOT DETECTED

## 2025-01-27 ENCOUNTER — OFFICE VISIT (OUTPATIENT)
Dept: SLEEP CENTER | Facility: CLINIC | Age: 65
End: 2025-01-27
Payer: COMMERCIAL

## 2025-01-27 VITALS
TEMPERATURE: 97.5 F | SYSTOLIC BLOOD PRESSURE: 130 MMHG | HEART RATE: 66 BPM | OXYGEN SATURATION: 97 % | HEIGHT: 69 IN | RESPIRATION RATE: 18 BRPM | WEIGHT: 227 LBS | BODY MASS INDEX: 33.62 KG/M2 | DIASTOLIC BLOOD PRESSURE: 80 MMHG

## 2025-01-27 DIAGNOSIS — E66.811 CLASS 1 OBESITY DUE TO EXCESS CALORIES WITHOUT SERIOUS COMORBIDITY WITH BODY MASS INDEX (BMI) OF 33.0 TO 33.9 IN ADULT: ICD-10-CM

## 2025-01-27 DIAGNOSIS — I10 BENIGN ESSENTIAL HYPERTENSION: ICD-10-CM

## 2025-01-27 DIAGNOSIS — G47.33 OBSTRUCTIVE SLEEP APNEA TREATED WITH CONTINUOUS POSITIVE AIRWAY PRESSURE (CPAP): Primary | ICD-10-CM

## 2025-01-27 DIAGNOSIS — E66.09 CLASS 1 OBESITY DUE TO EXCESS CALORIES WITHOUT SERIOUS COMORBIDITY WITH BODY MASS INDEX (BMI) OF 33.0 TO 33.9 IN ADULT: ICD-10-CM

## 2025-01-27 PROCEDURE — 99214 OFFICE O/P EST MOD 30 MIN: CPT | Performed by: NURSE PRACTITIONER

## 2025-01-27 PROCEDURE — G2211 COMPLEX E/M VISIT ADD ON: HCPCS | Performed by: NURSE PRACTITIONER

## 2025-01-27 NOTE — PATIENT INSTRUCTIONS
Patient Instructions:    1.  Continue use of CPAP equipment nightly  2.  Continue to clean your equipment, as discussed  3.  Contact the Sleep Disorders Center with any questions or concerns prior to your next visit, as needed  4.  Schedule visit for follow-up in 1 year     Thank you for trusting me with your care!    I know we often  cover a lot of information at the visit, so if you have follow-up questions, are unclear about the plan, or feel there were important items that we did not discuss or you did not receive clarity on, please don't hesitate to reach out to me.     ROCKETHOME messages are preferred for routine matters.  Please make sure to call for urgent matters as there can be a delay in responding to questions over ROCKETHOME.    IMPORTANT- Prior to a sleep study (at home or in the sleep lab), I strongly recommend contacting your medical insurance first to understand your benefits (including deductible if applicable), coverage for this test, and out of pocket costs.  Even if the test is approved by medical insurance, the cost to you is determined by your medical benefits.   If you have concerns about your out of pocket costs for sleep testing, please contact me/the office before you complete the test and we can discuss if there are alternate options.     I recommend following this advice in general before any lab test, imaging test, doctor visit, surgery, or ordering CPAP supplies as it is best to understand your coverage to avoid unexpected bills after the fact.       Nursing Support:  When: Monday through Friday 7:30A-4:30PM except holidays  Where: Our direct line is [phone]  *3  *1.      If you are having a true emergency please call 911.  In the event that the line is busy or it is after hours please leave a voice message and we will return your call.  Please speak clearly, leaving your full name, birth date, best number to reach you and the reason for your call.   Medication refills: We will need  the name of the medication, the dosage, the ordering provider, whether you get a 30 or 90 day refill, and the pharmacy name and address.  Medications will be ordered by the provider only.  Nurses cannot call in prescriptions.  Please allow 7 days for medication refills.  Physician requested updates: If your provider requested that you call with an update after starting medication, please be ready to provide us the medication and dosage, what time you take your medication, the time you attempt to fall asleep, time you fall asleep, when you wake up, and what time you get out of bed.  Sleep Study Results: We will contact you with sleep study results and/or next steps after the physician has reviewed your testing.

## 2025-01-27 NOTE — ASSESSMENT & PLAN NOTE
He continues to use CPAP equipment nightly.  He feels the current mask and settings are comfortable.  He feels he may open his mouth at times, but has tried use of a chin strap in the past and didn't like it.  He feels most comfortable with a nasal pillow mask.  He has additional equipment that he takes with him when traveling and reports that he used it early in this compliance period.  He typically sleeps through the night, occasionally waking 1-2 times per night.  He returns to sleep without difficulty.  He denies daytime sleepiness with use of CPAP equipment.        Current PAP Compliance Data:  Average usage:  He has been able to use the equipment 93% of all days recorded.  Average usage was 4 or more hours 93% of all days recorded.  Average hours used:  7 hours and 55 minutes  Average time in an air leak:  up to 42.3   Average pressure:  10.8 cm of water pressure  Residual AHI:  0.8/hour, including 0.5 OA, 0.0 CA, 0.2 hypopneas, 0.1 unknown    The patient feels they benefit from the use of PAP equipment and would like to continue PAP therapy.    Response to treatment has been good.    The patient is at goal for hours of PAP use and at goal for effectiveness of treatment.  No changes needed at this time.  A prescription for supplies has been provided to last for the next year.  The patient was advised to continue to clean the equipment appropriately, as discussed and to change supplies regularly.    He will continue using this equipment at the settings noted above for the next 12 months.  At that timehe will then return for a routine follow-up evaluation. I have asked the patient to contact the Sleep Disorders Center if he encounters any difficulties prior to that time.  Orders:    PAP DME Resupply/Reorder

## 2025-01-27 NOTE — ASSESSMENT & PLAN NOTE
Hypertension - Blood pressure is stable today.  We reviewed the association between untreated obstructive sleep apnea and the increased risk for hypertension. Patient to continue on prescribed anti-hypertensive therapy (cozaar) and follow up with PCP for continuity of care.

## 2025-01-27 NOTE — PROGRESS NOTES
Name: Ryan Lucas      : 1960      MRN: [id number]  Encounter Provider: ROBERT Escobedo  Encounter Date: 2025   Encounter department: [facility] [place]  :  Assessment & Plan  Obstructive sleep apnea treated with continuous positive airway pressure (CPAP)  He continues to use CPAP equipment nightly.  He feels the current mask and settings are comfortable.  He feels he may open his mouth at times, but has tried use of a chin strap in the past and didn't like it.  He feels most comfortable with a nasal pillow mask.  He has additional equipment that he takes with him when traveling and reports that he used it early in this compliance period.  He typically sleeps through the night, occasionally waking 1-2 times per night.  He returns to sleep without difficulty.  He denies daytime sleepiness with use of CPAP equipment.        Current PAP Compliance Data:  Average usage:  He has been able to use the equipment 93% of all days recorded.  Average usage was 4 or more hours 93% of all days recorded.  Average hours used:  7 hours and 55 minutes  Average time in an air leak:  up to 42.3   Average pressure:  10.8 cm of water pressure  Residual AHI:  0.8/hour, including 0.5 OA, 0.0 CA, 0.2 hypopneas, 0.1 unknown    The patient feels they benefit from the use of PAP equipment and would like to continue PAP therapy.    Response to treatment has been good.    The patient is at goal for hours of PAP use and at goal for effectiveness of treatment.  No changes needed at this time.  A prescription for supplies has been provided to last for the next year.  The patient was advised to continue to clean the equipment appropriately, as discussed and to change supplies regularly.    He will continue using this equipment at the settings noted above for the next 12 months.  At that timehe will then return for a routine follow-up evaluation. I have asked the patient to contact the Sleep Disorders Center if he  encounters any difficulties prior to that time.  Orders:    PAP DME Resupply/Reorder    Benign essential hypertension  Hypertension - Blood pressure is stable today.  We reviewed the association between untreated obstructive sleep apnea and the increased risk for hypertension. Patient to continue on prescribed anti-hypertensive therapy (cozaar) and follow up with PCP for continuity of care.          Class 1 obesity due to excess calories without serious comorbidity with body mass index (BMI) of 33.0 to 33.9 in adult  Obesity - We reviewed the association of obesity on obstructive sleep apnea and sleep disordered breathing. Encouraged patient to follow healthy diet, regular exercise regimen, and pursue weight loss to manage symptoms.              History of Present Illness   Ryan Lucas is a 63 y.o. year old male.  The patient presents for follow up of obstructive sleep apnea.  He was originally diagnosed with DIANA in 2007.  He has used CPAP equipment for more than 15 years and reports that he can't sleep without using the equipment.    DIANA was re-evaluated with a split night sleep study in June 2023.  AHI was 20.3, worsening to 72 in REM sleep.  Oxygen eitan was 79% with only 1.7 minutes spent at saturations less than 90%.  There were minimal PLMs noted associated with arousal.  He presents to review compliance and effectiveness of treatment with CPAP equipment.            Sitting and reading: (Patient-Rptd) (P) Would never doze  Watching TV: (Patient-Rptd) (P) Slight chance of dozing  Sitting, inactive in a public place (e.g. a theatre or a meeting): (Patient-Rptd) (P) Would never doze  As a passenger in a car for an hour without a break: (Patient-Rptd) (P) Would never doze  Lying down to rest in the afternoon when circumstances permit: (Patient-Rptd) (P) Slight chance of dozing  Sitting and talking to someone: (Patient-Rptd) (P) Would never doze  Sitting quietly after a lunch without alcohol: (Patient-Rptd) (P)  "Would never doze  In a car, while stopped for a few minutes in traffic: (Patient-Rptd) (P) Would never doze  Total score: (Patient-Rptd) (P) 2     Review of Systems  Pertinent positives/negatives included in HPI and also as noted: none    Current Outpatient Medications on File Prior to Visit   Medication Sig Dispense Refill    atorvastatin (LIPITOR) 20 mg tablet Take 20 mg by mouth      diclofenac (VOLTAREN) 75 mg EC tablet Take 75 mg by mouth      levocetirizine (XYZAL) 5 MG tablet       losartan (COZAAR) 100 MG tablet every 24 hours      omeprazole (PriLOSEC) 20 mg delayed release capsule       Ozempic, 0.25 or 0.5 MG/DOSE, 2 MG/3ML injection pen Inject 0.25 mg under the skin Once a week      Cholecalciferol 1.25 MG (06708 UT) TABS Take 50,000 Int'l Units by mouth      oxyCODONE-acetaminophen (PERCOCET) 5-325 mg per tablet Take 1 tablet by mouth as needed  (Patient not taking: Reported on 1/27/2025)       No current facility-administered medications on file prior to visit.      Objective   /80 (BP Location: Left arm, Patient Position: Sitting, Cuff Size: Standard)   Pulse 66   Temp 97.5 °F (36.4 °C) (Temporal)   Resp 18   Ht 5' 9\" (1.753 m)   Wt 103 kg (227 lb)   SpO2 97%   BMI 33.52 kg/m²        Physical Exam    Constitutional: Alert, cooperative, no distress, appears stated age, obese  Skin: Warm, dry, no rashes noted  Lungs: Clear to auscultation bilaterally, respirations unlabored  Heart: normal rate and regular rhythm, S1/2 normal, no murmur noted, no rub or gallop  Extremities: Normal, no digital clubbing, no pedal edema  Neuro: No focal deficits noted    Data  No results found for: \"HGB\", \"HCT\", \"MCV\"   Lab Results   Component Value Date    CALCIUM 9.6 01/22/2025    K 4.6 01/22/2025    CO2 30 01/22/2025     01/22/2025    BUN 14 01/22/2025    CREATININE 1.04 01/22/2025     No results found for: \"IRON\", \"TIBC\", \"FERRITIN\"  Lab Results   Component Value Date    AST 22 01/22/2025    ALT 23 " 01/22/2025       Administrative Statements   I have spent a total time of 30 minutes in caring for this patient on the day of the visit/encounter including Risks and benefits of tx options, Instructions for management, Patient and family education, Importance of tx compliance, Risk factor reductions, Impressions, Counseling / Coordination of care, Documenting in the medical record, and Reviewing / ordering tests, medicine, procedures  .

## 2025-01-28 ENCOUNTER — TELEPHONE (OUTPATIENT)
Dept: SLEEP CENTER | Facility: CLINIC | Age: 65
End: 2025-01-28

## 2025-01-29 LAB
DME PARACHUTE DELIVERY DATE REQUESTED: NORMAL
DME PARACHUTE ITEM DESCRIPTION: NORMAL
DME PARACHUTE ORDER STATUS: NORMAL
DME PARACHUTE SUPPLIER NAME: NORMAL
DME PARACHUTE SUPPLIER PHONE: NORMAL